# Patient Record
(demographics unavailable — no encounter records)

---

## 2024-12-11 NOTE — ASSESSMENT
[FreeTextEntry1] : Complete skin examination is negative for malignancy; Multiple new concerns were addressed and discussed. therapeutic options and their risks and benefits; along with multiple diagnostic possibilities were discussed at length; risks and benefits of skin biopsy and/or other further study were discussed;  prior BCC healed well  Continue regular exams; Follow up for TBSE in 6 months  MM 2003; doing well  macular SK R post. ankle, benign nevus R shin  Follow up for TBSE in 6 months

## 2024-12-11 NOTE — PHYSICAL EXAM
[Full Body Skin Exam Performed] : performed [FreeTextEntry3] : Skin examination performed of the face, neck, trunk, arms, legs;  The patient is well, alert and oriented, pleasant and cooperative. Eyelids, conjunctivae, oral mucosa, digits and nails all normal.   No cervical adenopathy.  Normal findings include:  Multiple benign nevi were noted. compound nevus R shin  Scaling waxy stuck on papules;  darker macular SK Right posterior ankle Angiomas + lentigines and solar damage are present in sun exposed areas;  No recurrent MM Right anterior shoulder  no residual BCC; healed scar Left temple   No lesions suspicious for malignancy.

## 2025-05-02 NOTE — REVIEW OF SYSTEMS
8/20/2024  I, Ryan Flores MD, saw and evaluated the patient. I have discussed the patient with the resident/non-physician practitioner and agree with the resident's/non-physician practitioner's findings, Plan of Care, and MDM as documented in the resident's/non-physician practitioner's note, except where noted. All available labs and Radiology studies were reviewed.  I was present for key portions of any procedure(s) performed by the resident/non-physician practitioner and I was immediately available to provide assistance.       At this point I agree with the current assessment done in the Emergency Department.  I have conducted an independent evaluation of this patient a history and physical is as follows:    62-year-old female, presents with caregiver for evaluation of fever and generalized weakness noticed today.  Patient is nonverbal at baseline.  On exam, patient in no distress with normal respiratory effort.  Abdomen soft and nontender.    ED Course     Patient febrile in ED, urinalysis and labs reviewed.  Elevated lactic acid, urinalysis with signs of urinary tract infection.  Patient given IV fluids and antibiotics, will be admitted for further monitoring and treatment.    Critical Care Time  Procedures      
[Negative] : Heme/Lymph

## 2025-05-08 NOTE — PHYSICAL EXAM
[No Acute Distress] : no acute distress [Well Nourished] : well nourished [Well Developed] : well developed [No JVD] : no jugular venous distention [No Lymphadenopathy] : no lymphadenopathy [Supple] : supple [Thyroid Normal, No Nodules] : the thyroid was normal and there were no nodules present [No Respiratory Distress] : no respiratory distress  [No Accessory Muscle Use] : no accessory muscle use [Normal Rate] : normal rate  [Clear to Auscultation] : lungs were clear to auscultation bilaterally [Regular Rhythm] : with a regular rhythm [Normal S1, S2] : normal S1 and S2 [No Masses] : no palpable masses [No Axillary Lymphadenopathy] : no axillary lymphadenopathy [Soft] : abdomen soft [Normal Supraclavicular Nodes] : no supraclavicular lymphadenopathy [Normal Axillary Nodes] : no axillary lymphadenopathy [Normal Posterior Cervical Nodes] : no posterior cervical lymphadenopathy [Normal Anterior Cervical Nodes] : no anterior cervical lymphadenopathy [No CVA Tenderness] : no CVA  tenderness [No Spinal Tenderness] : no spinal tenderness [No Rash] : no rash [Normal Gait] : normal gait [Normal Affect] : the affect was normal [Normal Insight/Judgement] : insight and judgment were intact [de-identified] : Noted bilateral mastectomy with implant reconstruction. Palpable muscle capsule along the outer margins of both reconstructed breasts.

## 2025-05-08 NOTE — HISTORY OF PRESENT ILLNESS
[FreeTextEntry1] : Overall feeling well.  No complaints of chest pain, nausea, vomiting.  No change in exercise tolerance.   HEALTH MAINTENANCE PCP Pk Littlejohn for her year comprehensive physical in approximately 2025 -- had ECG and blood work completed at that time.  Most recent colonoscopy  or  -- gets them every 3 years -- GI Dr. Carlin Shin with [facility].   GYN May 2024 -- Dr. Tabitha Monge.  No longer sees Dr Amairani Diallo/Dr. Ocasio for uterine cancer follow up. Last seen 16. Sees Dr. Bolton for Breast Surgery.  Follows with Dr. Julio with Plastic Surgery.  Most recent bone density 2021, osteopenia -- will have it completed in 2025.  Saw dermatologist Dr Rakesh Lewis -- goes every 6 months.   She has had multiple genetic testing completed and it was always negative. Her daughters are contemplating getting prophylactic mastectomy/KEMI/BSO.   FHx: Father -- NHL,  at age 66 No breast, colon, prostate cancer in the family -- some question as to whether her Maternal GF's sister.  Sisters are both without a cancer diagnosis.  Mother  at age 94 - had hyperlipidemia and later developed dementia.

## 2025-05-08 NOTE — HEALTH RISK ASSESSMENT
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 Also has great friends. Has 2 sisters -- live in [place] and [place].  [FreeTextEntry7] : Describes her diet as relatively healthy -- would like to lose weight -- wants to portion control and carbohydrate. Does not like to cook. Eats out 4 times a week -- tries to avoid red meat,  [FreeTextEntry8] : Sleeps well.  [FreeTextEntry9] : Walks 2-3 miles, 5-6 days a week.  [de-identified] : no urgent care visits

## 2025-05-08 NOTE — DISCUSSION/SUMMARY
[FreeTextEntry1] : 2003: right shoulder melanoma local - s/p resection by Dr. Rakesh Lewis. 2005, at age 50, with an abnormal screening mammogram.  The patient ultimately underwent an RIGHT ultrasound-guided core biopsy which was positive for well-differentiated infiltrating ductal carcinoma of the right breast with a minor component of DCIS. The cancer was ER positive (70%), ND positive (30%) HER-2/aurelia 2+ by IHC and negative by FISH. Dr. Meagan Bolton performed a right mastectomy and sentinel lymph node biopsy on September 5, 2005 which was followed by immediate implant placement by Dr. Danis Ocasio. Pathology from the mastectomy demonstrated 2 foci of well-differentiated infiltrating ductal carcinoma one measuring 2.2 cm and the other measuring 0.7 cm. Extensive intraductal carcinoma cribriform and micropapillary types was also present. The surgical margins were negative. [place] lymph nodes were negative. Postoperatively the patient received 4 cycles dose dense Adriamycin Cyclophosphamide chemotherapy which extended from October 26, 2005 to December 7, 2005. She received a cumulative dose of Adriamycin of 448 mg (240 mg per meter squared). The patient started anastrozole in December 2005. She developed arthralgias of the hands and her treatment was changed to letrozole. In October 2006 she developed multiple areas of pruritus. She had a rash which responded to steroids. It was unclear whether the rash was related to letrozole. Her treatment was subsequently changed to exemestane in November 2006. She took a total of 4 years and 8 months of aromatase inhibitor therapy. Had a prophylactic bilateral salping-oophorectomy in 2008.  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 She had Stage I A disease. Patient received 4 cycles of carboplatin/Taxol under the supervision of Dr. Amairani Diallo. In April 2011 the patient underwent LEFT breast risk reduction mastectomy and implant reconstruction by Dr. Ramiro Al. Because of the patient's multiple cancers she has undergone genetic testing 2/7/2018 including Comprehensive BRACAnalysis, AZEEM, mismatch protein repair analysis for Link syndrome, PTEN testing and GeneDX 64 gene panel.. All of these have been negative. 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

## 2025-05-08 NOTE — HEALTH RISK ASSESSMENT
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 Also has great friends. Has 2 sisters -- live in [place] and [place].  [FreeTextEntry7] : Describes her diet as relatively healthy -- would like to lose weight -- wants to portion control and carbohydrate. Does not like to cook. Eats out 4 times a week -- tries to avoid red meat,  [FreeTextEntry8] : Sleeps well.  [FreeTextEntry9] : Walks 2-3 miles, 5-6 days a week.  [de-identified] : no urgent care visits

## 2025-05-08 NOTE — HISTORY OF PRESENT ILLNESS
[FreeTextEntry1] : Overall feeling well.  No complaints of chest pain, nausea, vomiting.  No change in exercise tolerance.   HEALTH MAINTENANCE PCP kP Littlejohn for her year comprehensive physical in approximately 2025 -- had ECG and blood work completed at that time.  Most recent colonoscopy  or  -- gets them every 3 years -- GI Dr. Carlin Shin with [facility].   GYN May 2024 -- Dr. Tabitha Monge.  No longer sees Dr Amairani Diallo/Dr. Ocasio for uterine cancer follow up. Last seen 16. Sees Dr. Bolton for Breast Surgery.  Follows with Dr. Julio with Plastic Surgery.  Most recent bone density 2021, osteopenia -- will have it completed in 2025.  Saw dermatologist Dr Rakesh Lewis -- goes every 6 months.   She has had multiple genetic testing completed and it was always negative. Her daughters are contemplating getting prophylactic mastectomy/KEMI/BSO.   FHx: Father -- NHL,  at age 66 No breast, colon, prostate cancer in the family -- some question as to whether her Maternal GF's sister.  Sisters are both without a cancer diagnosis.  Mother  at age 94 - had hyperlipidemia and later developed dementia.

## 2025-05-08 NOTE — PHYSICAL EXAM
[No Acute Distress] : no acute distress [Well Nourished] : well nourished [Well Developed] : well developed [No JVD] : no jugular venous distention [No Lymphadenopathy] : no lymphadenopathy [Supple] : supple [Thyroid Normal, No Nodules] : the thyroid was normal and there were no nodules present [No Respiratory Distress] : no respiratory distress  [No Accessory Muscle Use] : no accessory muscle use [Clear to Auscultation] : lungs were clear to auscultation bilaterally [Normal Rate] : normal rate  [Regular Rhythm] : with a regular rhythm [Normal S1, S2] : normal S1 and S2 [No Masses] : no palpable masses [No Axillary Lymphadenopathy] : no axillary lymphadenopathy [Soft] : abdomen soft [Normal Supraclavicular Nodes] : no supraclavicular lymphadenopathy [Normal Axillary Nodes] : no axillary lymphadenopathy [Normal Posterior Cervical Nodes] : no posterior cervical lymphadenopathy [Normal Anterior Cervical Nodes] : no anterior cervical lymphadenopathy [No CVA Tenderness] : no CVA  tenderness [No Spinal Tenderness] : no spinal tenderness [No Rash] : no rash [Normal Gait] : normal gait [Normal Affect] : the affect was normal [Normal Insight/Judgement] : insight and judgment were intact [de-identified] : Noted bilateral mastectomy with implant reconstruction. Palpable muscle capsule along the outer margins of both reconstructed breasts.

## 2025-05-08 NOTE — DISCUSSION/SUMMARY
[FreeTextEntry1] : 2003: right shoulder melanoma local - s/p resection by Dr. Rakesh Lewis. 2005, at age 50, with an abnormal screening mammogram.  The patient ultimately underwent an RIGHT ultrasound-guided core biopsy which was positive for well-differentiated infiltrating ductal carcinoma of the right breast with a minor component of DCIS. The cancer was ER positive (70%), [place] positive (30%) HER-2/aurelia 2+ by IHC and negative by FISH. Dr. Meagan Bolton performed a right mastectomy and sentinel lymph node biopsy on September 5, 2005 which was followed by immediate implant placement by Dr. Danis Ocasio. Pathology from the mastectomy demonstrated 2 foci of well-differentiated infiltrating ductal carcinoma one measuring 2.2 cm and the other measuring 0.7 cm. Extensive intraductal carcinoma cribriform and micropapillary types was also present. The surgical margins were negative. [place] lymph nodes were negative. Postoperatively the patient received 4 cycles dose dense Adriamycin Cyclophosphamide chemotherapy which extended from October 26, 2005 to December 7, 2005. She received a cumulative dose of Adriamycin of 448 mg (240 mg per meter squared). The patient started anastrozole in December 2005. She developed arthralgias of the hands and her treatment was changed to letrozole. In October 2006 she developed multiple areas of pruritus. She had a rash which responded to steroids. It was unclear whether the rash was related to letrozole. Her treatment was subsequently changed to exemestane in November 2006. She took a total of 4 years and 8 months of aromatase inhibitor therapy. Had a prophylactic bilateral salping-oophorectomy in 2008.  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 She had Stage I A disease. Patient received 4 cycles of carboplatin/Taxol under the supervision of Dr. Amairani Diallo. In April 2011 the patient underwent LEFT breast risk reduction mastectomy and implant reconstruction by Dr. Ramiro Al. Because of the patient's multiple cancers she has undergone genetic testing 2/7/2018 including Comprehensive BRACAnalysis, AZEEM, mismatch protein repair analysis for Link syndrome, PTEN testing and GeneDX 64 gene panel.. All of these have been negative. 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

## 2025-05-08 NOTE — HISTORY OF PRESENT ILLNESS
[FreeTextEntry1] : Overall feeling well.  No complaints of chest pain, nausea, vomiting.  No change in exercise tolerance.   HEALTH MAINTENANCE PCP Pk Littlejohn for her year comprehensive physical in approximately 2025 -- had ECG and blood work completed at that time.  Most recent colonoscopy  or  -- gets them every 3 years -- GI Dr. Carlin Shin with [facility].   GYN May 2024 -- Dr. Tabitha Monge.  No longer sees Dr Amairani Diallo/Dr. Ocasio for uterine cancer follow up. Last seen 16. Sees Dr. Bolton for Breast Surgery.  Follows with Dr. Julio with Plastic Surgery.  Most recent bone density 2021, osteopenia -- will have it completed in 2025.  Saw dermatologist Dr Rakesh Lewis -- goes every 6 months.   She has had multiple genetic testing completed and it was always negative. Her daughters are contemplating getting prophylactic mastectomy/KEMI/BSO.   FHx: Father -- NHL,  at age 66 No breast, colon, prostate cancer in the family -- some question as to whether her Maternal GF's sister.  Sisters are both without a cancer diagnosis.  Mother  at age 94 - had hyperlipidemia and later developed dementia.

## 2025-05-08 NOTE — PHYSICAL EXAM
[No Acute Distress] : no acute distress [Well Nourished] : well nourished [Well Developed] : well developed [No JVD] : no jugular venous distention [No Lymphadenopathy] : no lymphadenopathy [Supple] : supple [Thyroid Normal, No Nodules] : the thyroid was normal and there were no nodules present [No Respiratory Distress] : no respiratory distress  [No Accessory Muscle Use] : no accessory muscle use [Normal Rate] : normal rate  [Clear to Auscultation] : lungs were clear to auscultation bilaterally [Regular Rhythm] : with a regular rhythm [Normal S1, S2] : normal S1 and S2 [No Masses] : no palpable masses [No Axillary Lymphadenopathy] : no axillary lymphadenopathy [Soft] : abdomen soft [Normal Supraclavicular Nodes] : no supraclavicular lymphadenopathy [Normal Axillary Nodes] : no axillary lymphadenopathy [Normal Posterior Cervical Nodes] : no posterior cervical lymphadenopathy [Normal Anterior Cervical Nodes] : no anterior cervical lymphadenopathy [No CVA Tenderness] : no CVA  tenderness [No Spinal Tenderness] : no spinal tenderness [No Rash] : no rash [Normal Gait] : normal gait [Normal Affect] : the affect was normal [Normal Insight/Judgement] : insight and judgment were intact [de-identified] : Noted bilateral mastectomy with implant reconstruction. Palpable muscle capsule along the outer margins of both reconstructed breasts.

## 2025-05-08 NOTE — HEALTH RISK ASSESSMENT
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 Also has great friends. Has 2 sisters -- live in [place] and [place].  [FreeTextEntry7] : Describes her diet as relatively healthy -- would like to lose weight -- wants to portion control and carbohydrate. Does not like to cook. Eats out 4 times a week -- tries to avoid red meat,  [FreeTextEntry8] : Sleeps well.  [FreeTextEntry9] : Walks 2-3 miles, 5-6 days a week.  [de-identified] : no urgent care visits

## 2025-05-08 NOTE — DISCUSSION/SUMMARY
[FreeTextEntry1] : 2003: right shoulder melanoma local - s/p resection by Dr. Rakesh Lewis. 2005, at age 50, with an abnormal screening mammogram.  The patient ultimately underwent an RIGHT ultrasound-guided core biopsy which was positive for well-differentiated infiltrating ductal carcinoma of the right breast with a minor component of DCIS. The cancer was ER positive (70%), [place] positive (30%) HER-2/aurelia 2+ by IHC and negative by FISH. Dr. Meagan Bolton performed a right mastectomy and sentinel lymph node biopsy on September 5, 2005 which was followed by immediate implant placement by Dr. Danis Ocasio. Pathology from the mastectomy demonstrated 2 foci of well-differentiated infiltrating ductal carcinoma one measuring 2.2 cm and the other measuring 0.7 cm. Extensive intraductal carcinoma cribriform and micropapillary types was also present. The surgical margins were negative. [place] lymph nodes were negative. Postoperatively the patient received 4 cycles dose dense Adriamycin Cyclophosphamide chemotherapy which extended from October 26, 2005 to December 7, 2005. She received a cumulative dose of Adriamycin of 448 mg (240 mg per meter squared). The patient started anastrozole in December 2005. She developed arthralgias of the hands and her treatment was changed to letrozole. In October 2006 she developed multiple areas of pruritus. She had a rash which responded to steroids. It was unclear whether the rash was related to letrozole. Her treatment was subsequently changed to exemestane in November 2006. She took a total of 4 years and 8 months of aromatase inhibitor therapy. Had a prophylactic bilateral salping-oophorectomy in 2008.  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 She had Stage I A disease. Patient received 4 cycles of carboplatin/Taxol under the supervision of Dr. Amairani Diallo. In April 2011 the patient underwent LEFT breast risk reduction mastectomy and implant reconstruction by Dr. Ramiro Al. Because of the patient's multiple cancers she has undergone genetic testing 2/7/2018 including Comprehensive BRACAnalysis, AZEEM, mismatch protein repair analysis for Link syndrome, PTEN testing and GeneDX 64 gene panel.. All of these have been negative. 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

## 2025-06-18 NOTE — ASSESSMENT
[FreeTextEntry1] : Complete skin examination is negative for malignancy; Multiple new concerns were addressed and discussed. therapeutic options and their risks and benefits; along with multiple diagnostic possibilities were discussed at length; risks and benefits of skin biopsy and/or other further study were discussed;  prior BCC healed well  Continue regular exams; Follow up for TBSE in 6 months  MM 2003; doing well  macular SK R post. ankle, benign nevus R shin cryo to AK left nose Follow up for TBSE in 6 months

## 2025-06-18 NOTE — HISTORY OF PRESENT ILLNESS
[de-identified] : Pt. presents for skin check; c/o few spots of concern; new scaly spot on nose Severity:  mild   Modifying factors:  none Associated symptoms:  none Context:  no association with activity  Hx  BCC L temple D&C 6/2020 Hx MM back;  2003

## 2025-06-18 NOTE — PHYSICAL EXAM
[Full Body Skin Exam Performed] : performed [FreeTextEntry3] : Skin examination performed of the face, neck, trunk, arms, legs;  The patient is well, alert and oriented, pleasant and cooperative. Eyelids, conjunctivae, oral mucosa, digits and nails all normal.   No cervical adenopathy.  Normal findings include: Multiple benign nevi were noted. compound nevus R shin  Scaling waxy stuck on papules;  darker macular SK Right posterior ankle Angiomas + lentigines and solar damage are present in sun exposed areas;  No recurrent MM Right anterior shoulder  no residual BCC; healed scar Left temple scaling erythematous papule; small- left nose sidewall   No lesions suspicious for malignancy.